# Patient Record
Sex: FEMALE | Race: WHITE | NOT HISPANIC OR LATINO | Employment: FULL TIME | URBAN - METROPOLITAN AREA
[De-identification: names, ages, dates, MRNs, and addresses within clinical notes are randomized per-mention and may not be internally consistent; named-entity substitution may affect disease eponyms.]

---

## 2023-04-06 DIAGNOSIS — I10 HYPERTENSION, UNSPECIFIED TYPE: ICD-10-CM

## 2023-04-06 RX ORDER — ATENOLOL 25 MG/1
25 TABLET ORAL DAILY
COMMUNITY
End: 2023-04-06 | Stop reason: SDUPTHER

## 2023-04-06 RX ORDER — LISINOPRIL AND HYDROCHLOROTHIAZIDE 20; 25 MG/1; MG/1
1 TABLET ORAL DAILY
COMMUNITY
End: 2023-04-06 | Stop reason: SDUPTHER

## 2023-04-07 RX ORDER — LISINOPRIL AND HYDROCHLOROTHIAZIDE 20; 25 MG/1; MG/1
1 TABLET ORAL DAILY
Qty: 90 TABLET | Refills: 1 | Status: SHIPPED | OUTPATIENT
Start: 2023-04-07 | End: 2023-07-05 | Stop reason: SDUPTHER

## 2023-04-07 RX ORDER — ATENOLOL 25 MG/1
25 TABLET ORAL DAILY
Qty: 90 TABLET | Refills: 1 | Status: SHIPPED | OUTPATIENT
Start: 2023-04-07 | End: 2023-07-05 | Stop reason: SDUPTHER

## 2023-07-05 DIAGNOSIS — I10 HYPERTENSION, UNSPECIFIED TYPE: ICD-10-CM

## 2023-07-05 RX ORDER — ATENOLOL 25 MG/1
25 TABLET ORAL DAILY
Qty: 90 TABLET | Refills: 1 | Status: SHIPPED | OUTPATIENT
Start: 2023-07-05 | End: 2024-01-05 | Stop reason: SDUPTHER

## 2023-07-05 RX ORDER — LISINOPRIL AND HYDROCHLOROTHIAZIDE 20; 25 MG/1; MG/1
1 TABLET ORAL DAILY
Qty: 90 TABLET | Refills: 3 | Status: SHIPPED | OUTPATIENT
Start: 2023-07-05

## 2023-09-25 ENCOUNTER — OFFICE VISIT (OUTPATIENT)
Dept: PRIMARY CARE | Facility: CLINIC | Age: 58
End: 2023-09-25
Payer: COMMERCIAL

## 2023-09-25 ENCOUNTER — LAB (OUTPATIENT)
Dept: LAB | Facility: LAB | Age: 58
End: 2023-09-25
Payer: COMMERCIAL

## 2023-09-25 VITALS
BODY MASS INDEX: 35 KG/M2 | OXYGEN SATURATION: 97 % | HEART RATE: 67 BPM | HEIGHT: 64 IN | WEIGHT: 205 LBS | SYSTOLIC BLOOD PRESSURE: 126 MMHG | DIASTOLIC BLOOD PRESSURE: 82 MMHG

## 2023-09-25 DIAGNOSIS — Z12.11 ENCOUNTER FOR COLORECTAL CANCER SCREENING: ICD-10-CM

## 2023-09-25 DIAGNOSIS — Z00.00 ANNUAL PHYSICAL EXAM: ICD-10-CM

## 2023-09-25 DIAGNOSIS — Z00.00 ANNUAL PHYSICAL EXAM: Primary | ICD-10-CM

## 2023-09-25 DIAGNOSIS — Z12.12 ENCOUNTER FOR COLORECTAL CANCER SCREENING: ICD-10-CM

## 2023-09-25 DIAGNOSIS — I10 HYPERTENSION, UNSPECIFIED TYPE: ICD-10-CM

## 2023-09-25 LAB
ALANINE AMINOTRANSFERASE (SGPT) (U/L) IN SER/PLAS: 42 U/L (ref 7–45)
ALBUMIN (G/DL) IN SER/PLAS: 4.1 G/DL (ref 3.4–5)
ALKALINE PHOSPHATASE (U/L) IN SER/PLAS: 122 U/L (ref 33–110)
ANION GAP IN SER/PLAS: 14 MMOL/L (ref 10–20)
ASPARTATE AMINOTRANSFERASE (SGOT) (U/L) IN SER/PLAS: 24 U/L (ref 9–39)
BILIRUBIN TOTAL (MG/DL) IN SER/PLAS: 0.5 MG/DL (ref 0–1.2)
CALCIDIOL (25 OH VITAMIN D3) (NG/ML) IN SER/PLAS: 26 NG/ML
CALCIUM (MG/DL) IN SER/PLAS: 9.8 MG/DL (ref 8.6–10.6)
CARBON DIOXIDE, TOTAL (MMOL/L) IN SER/PLAS: 27 MMOL/L (ref 21–32)
CHLORIDE (MMOL/L) IN SER/PLAS: 105 MMOL/L (ref 98–107)
CHOLESTEROL (MG/DL) IN SER/PLAS: 157 MG/DL (ref 0–199)
CHOLESTEROL IN HDL (MG/DL) IN SER/PLAS: 48.3 MG/DL
CHOLESTEROL/HDL RATIO: 3.3
CREATININE (MG/DL) IN SER/PLAS: 0.81 MG/DL (ref 0.5–1.05)
ERYTHROCYTE DISTRIBUTION WIDTH (RATIO) BY AUTOMATED COUNT: 13.1 % (ref 11.5–14.5)
ERYTHROCYTE MEAN CORPUSCULAR HEMOGLOBIN CONCENTRATION (G/DL) BY AUTOMATED: 31.5 G/DL (ref 32–36)
ERYTHROCYTE MEAN CORPUSCULAR VOLUME (FL) BY AUTOMATED COUNT: 95 FL (ref 80–100)
ERYTHROCYTES (10*6/UL) IN BLOOD BY AUTOMATED COUNT: 4.13 X10E12/L (ref 4–5.2)
GFR FEMALE: 84 ML/MIN/1.73M2
GLUCOSE (MG/DL) IN SER/PLAS: 87 MG/DL (ref 74–99)
HEMATOCRIT (%) IN BLOOD BY AUTOMATED COUNT: 39.1 % (ref 36–46)
HEMOGLOBIN (G/DL) IN BLOOD: 12.3 G/DL (ref 12–16)
LDL: 92 MG/DL (ref 0–99)
LEUKOCYTES (10*3/UL) IN BLOOD BY AUTOMATED COUNT: 7.1 X10E9/L (ref 4.4–11.3)
NRBC (PER 100 WBCS) BY AUTOMATED COUNT: 0 /100 WBC (ref 0–0)
PLATELETS (10*3/UL) IN BLOOD AUTOMATED COUNT: 315 X10E9/L (ref 150–450)
POTASSIUM (MMOL/L) IN SER/PLAS: 3.9 MMOL/L (ref 3.5–5.3)
PROTEIN TOTAL: 7 G/DL (ref 6.4–8.2)
SODIUM (MMOL/L) IN SER/PLAS: 142 MMOL/L (ref 136–145)
THYROTROPIN (MIU/L) IN SER/PLAS BY DETECTION LIMIT <= 0.05 MIU/L: 1.77 MIU/L (ref 0.44–3.98)
TRIGLYCERIDE (MG/DL) IN SER/PLAS: 85 MG/DL (ref 0–149)
UREA NITROGEN (MG/DL) IN SER/PLAS: 23 MG/DL (ref 6–23)
VLDL: 17 MG/DL (ref 0–40)

## 2023-09-25 PROCEDURE — 84443 ASSAY THYROID STIM HORMONE: CPT

## 2023-09-25 PROCEDURE — 1036F TOBACCO NON-USER: CPT | Performed by: INTERNAL MEDICINE

## 2023-09-25 PROCEDURE — 85027 COMPLETE CBC AUTOMATED: CPT

## 2023-09-25 PROCEDURE — 3074F SYST BP LT 130 MM HG: CPT | Performed by: INTERNAL MEDICINE

## 2023-09-25 PROCEDURE — 80053 COMPREHEN METABOLIC PANEL: CPT

## 2023-09-25 PROCEDURE — 82306 VITAMIN D 25 HYDROXY: CPT

## 2023-09-25 PROCEDURE — 93000 ELECTROCARDIOGRAM COMPLETE: CPT | Performed by: INTERNAL MEDICINE

## 2023-09-25 PROCEDURE — 3079F DIAST BP 80-89 MM HG: CPT | Performed by: INTERNAL MEDICINE

## 2023-09-25 PROCEDURE — 80061 LIPID PANEL: CPT

## 2023-09-25 PROCEDURE — 99396 PREV VISIT EST AGE 40-64: CPT | Performed by: INTERNAL MEDICINE

## 2023-09-25 PROCEDURE — 36415 COLL VENOUS BLD VENIPUNCTURE: CPT

## 2023-09-25 RX ORDER — ASPIRIN 325 MG
TABLET ORAL
COMMUNITY
Start: 2021-04-12

## 2023-09-25 NOTE — PROGRESS NOTES
"Subjective   Patient ID: Liebrtad Rodrigues is a 58 y.o. female who presents for the following    PHYSICAL 9/25/2023    Assessment/Plan     Refer to gyn    Mammogram screening    EKG    Cologuard sent    CBC, CMP, lipid panel, a1c, tsh, vitamin d      HPI  Female with htn, anxiety, depression, who presents for the fllowing     LYMPHEDEMA: patient is getting dependent edema after a long shift and she has lymphedema. she wants to know what to do about it.       HTN: patient denies any headaches, blurred vision or dizziness. patient denies any stroke like symptoms     anxiety/depression: stable on zoloft. she had ran out of it. denies SI or HI. sleep has been slightly poor recently. increased appetite.      surgical hx: hysterectomy 2-3 years ago for menorrhagia and cysts      allergies: NKDA     family hx: father HTN, DM     social hx: lives with her father. denies smoking, rare alcohol, denies drug use. works at nursing home     cologuard normal several years ago       Visit Vitals  /82   Pulse 67   Ht 1.626 m (5' 4\")   Wt 93 kg (205 lb)   SpO2 97%   BMI 35.19 kg/m²   Smoking Status Never   BSA 2.05 m²     PHYSICAL EXAM   General appearance: Alert and in no acute distress. speech is clear and coherent  HEENT: Sclera and conjunctiva white, EOMI, uvela midline, no mouth lesions. PERRLA,  nasal turbinates are not swollen without exudate. TM's Neal with cone of light, external ear canal with scant cerumen. No head trauma  Neck: no carotid bruits or thyromegaly. no lymphadenopathy   Respiratory : No respiratory distress, normal respiratory rhythm and effort. Clear bilateral breath sounds. No wheezing or rhonchi.   Cardiovascular: heart rate regular, S1, S2. no murmurs. no Lower extremity edema  Skin inspection: Normal skin color and pigmentation, normal skin turgor and no visible rash, induration, or cellulitis  MSK: 5/5 strength upper and lower extremities without gait abnormalities. no loss of muscle mass "   Neuro: 2-12 CN grossly intact.  no slurred speech. no lateralizing deficit  Psychiatric Orientation: Oriented to person, place, and time. no depression, homicidal or suicidal thoughts, normal affect  Abdomen: soft, none tender, none distended. no organomegaly      REVIEW OF SYSTEMS   Constitutional: not feeling tired and no fever, chills or sweats. Denies weight loss    HEENT: no earache and no sore throat. no blurred vision and or double vision. no headache  Cardiovascular: no exertional chest pain, no palpitations, no lower extremity edema and no intermittent leg claudication.   Lungs: Denies shortness of breath, exertional dyspnea, wheezing  Gastrointestinal: no change in bowel habits, no diarrhea, no nausea, no vomiting and no abdominal pain. Denies Melena, brbpr or dark stool  Musculoskeletal: no myalgias, no muscle weakness and no limb swelling.   Skin: no rashes, no change in skin color and pigmentation and no skin lumps.   Neurological: no headaches, no seizures, no numbness, no lateralizing deficits and no fainting.   Psychiatric: no depression and no anxiety.   Urine: denies polyuria, hematuria, dysuria   Endocrine: no cold intolerance, no heat intolerance      No Known Allergies    Current Outpatient Medications   Medication Sig Dispense Refill    aspirin 325 mg tablet Take by mouth.      atenolol (Tenormin) 25 mg tablet Take 1 tablet (25 mg) by mouth once daily. 90 tablet 1    lisinopriL-hydrochlorothiazide 20-25 mg tablet Take 1 tablet by mouth once daily. 90 tablet 3     No current facility-administered medications for this visit.       Objective     No visits with results within 4 Month(s) from this visit.   Latest known visit with results is:   Legacy Encounter on 04/12/2021   Component Date Value Ref Range Status    Vitamin D, 25-Hydroxy 04/12/2021 17 (A)  ng/mL Final    Cholesterol 04/12/2021 156  0 - 199 mg/dL Final    HDL 04/12/2021 36.0 (A)  mg/dL Final    Cholesterol/HDL Ratio 04/12/2021 4.3    Final    LDL 04/12/2021 82  0 - 99 mg/dL Final    VLDL 04/12/2021 38  0 - 40 mg/dL Final    Triglycerides 04/12/2021 191 (H)  0 - 149 mg/dL Final    TSH 04/12/2021 1.97  0.44 - 3.98 mIU/L Final    Hemoglobin A1C 04/12/2021 5.6  % Final    Estimated Average Glucose 04/12/2021 114  MG/DL Final    Glucose 04/12/2021 88  74 - 99 mg/dL Final    Sodium 04/12/2021 142  136 - 145 mmol/L Final    Potassium 04/12/2021 3.7  3.5 - 5.3 mmol/L Final    Chloride 04/12/2021 104  98 - 107 mmol/L Final    Bicarbonate 04/12/2021 28  21 - 32 mmol/L Final    Anion Gap 04/12/2021 14  10 - 20 mmol/L Final    Urea Nitrogen 04/12/2021 16  6 - 23 mg/dL Final    Creatinine 04/12/2021 0.72  0.50 - 1.05 mg/dL Final    GLOMERULAR FILTRATION RATE-NON AFR* 04/12/2021 >60  >60 mL/min/1.73m2 Final    GLOMERULAR FILTRATION RATE-* 04/12/2021 >60  >60 mL/min/1.73m2 Final    Calcium 04/12/2021 8.8  8.6 - 10.6 mg/dL Final    Albumin 04/12/2021 4.0  3.4 - 5.0 g/dL Final    Alkaline Phosphatase 04/12/2021 99  33 - 110 U/L Final    Total Protein 04/12/2021 7.0  6.4 - 8.2 g/dL Final    AST 04/12/2021 17  9 - 39 U/L Final    Total Bilirubin 04/12/2021 0.4  0.0 - 1.2 mg/dL Final    ALT (SGPT) 04/12/2021 27  7 - 45 U/L Final    WBC 04/12/2021 6.5  4.4 - 11.3 x10E9/L Final    nRBC 04/12/2021 0.0  0.0 - 0.0 /100 WBC Final    RBC 04/12/2021 4.46  4.00 - 5.20 x10E12/L Final    Hemoglobin 04/12/2021 13.0  12.0 - 16.0 g/dL Final    Hematocrit 04/12/2021 41.5  36.0 - 46.0 % Final    MCV 04/12/2021 93  80 - 100 fL Final    MCHC 04/12/2021 31.3 (L)  32.0 - 36.0 g/dL Final    Platelets 04/12/2021 323  150 - 450 x10E9/L Final    RDW 04/12/2021 13.5  11.5 - 14.5 % Final       Radiology: Reviewed imaging in powerchart.  No results found.    No family history on file.  Social History     Socioeconomic History    Marital status:      Spouse name: None    Number of children: None    Years of education: None    Highest education level: None   Occupational  History    None   Tobacco Use    Smoking status: Never    Smokeless tobacco: Never   Substance and Sexual Activity    Alcohol use: Not Currently    Drug use: None    Sexual activity: None   Other Topics Concern    None   Social History Narrative    None     Social Determinants of Health     Financial Resource Strain: Not on file   Food Insecurity: Not on file   Transportation Needs: Not on file   Physical Activity: Not on file   Stress: Not on file   Social Connections: Not on file   Intimate Partner Violence: Not on file   Housing Stability: Not on file     Past Medical History:   Diagnosis Date    Encounter for screening for malignant neoplasm of vagina     Vaginal Pap smear    Other specified health status     No pertinent past surgical history    Personal history of other medical treatment     History of mammogram     Past Surgical History:   Procedure Laterality Date    OTHER SURGICAL HISTORY  04/12/2021    Hysterectomy       Charting was completed using voice recognition technology and may include unintended errors.

## 2023-10-09 ENCOUNTER — TELEMEDICINE (OUTPATIENT)
Dept: PRIMARY CARE | Facility: CLINIC | Age: 58
End: 2023-10-09
Payer: COMMERCIAL

## 2023-10-09 VITALS — BODY MASS INDEX: 35 KG/M2 | HEIGHT: 64 IN | WEIGHT: 205 LBS

## 2023-10-09 DIAGNOSIS — R74.8 ELEVATED ALKALINE PHOSPHATASE LEVEL: Primary | ICD-10-CM

## 2023-10-09 PROCEDURE — 99213 OFFICE O/P EST LOW 20 MIN: CPT | Performed by: INTERNAL MEDICINE

## 2023-10-09 NOTE — PROGRESS NOTES
"Subjective   Patient ID: Libertad Rodrigues is a 58 y.o. female who presents for the following    Virtual visit regarding elevated alk phos and low vit d     PHYSICAL 9/25/2023    Assessment/Plan   Elevated alkaline phos; cmp, acute hepatitis , ferritin, CMV, ggt    Liver ultrasound     Vit D recommend 2000 units     Refer to gyn    Mammogram screening    EKG    Cologuard sent         HPI  Female with htn, anxiety, depression, who presents for the fllowing     Elevated alkaline phos level: patient vit d  is low and she does not take any OTC vit d. She denies any abdominal pain at this time. She does have arthritic pain in her back at times    Other medical issues, see below,  LYMPHEDEMA: patient is getting dependent edema after a long shift and she has lymphedema. she wants to know what to do about it.       HTN: patient denies any headaches, blurred vision or dizziness. patient denies any stroke like symptoms     anxiety/depression: stable on zoloft. she had ran out of it. denies SI or HI. sleep has been slightly poor recently. increased appetite.      surgical hx: hysterectomy 2-3 years ago for menorrhagia and cysts      allergies: NKDA     family hx: father HTN, DM     social hx: lives with her father. 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      atenolol (Tenormin) 25 mg tablet Take 1 tablet (25 mg) by mouth once daily. 90 tablet 1    lisinopriL-hydrochlorothiazide 20-25 mg tablet Take 1 tablet by mouth once daily. 90 tablet 3     No current facility-administered medications for this visit.       Objective     Lab on 09/25/2023   Component Date Value Ref " Range Status    WBC 09/25/2023 7.1  4.4 - 11.3 x10E9/L Final    nRBC 09/25/2023 0.0  0.0 - 0.0 /100 WBC Final    RBC 09/25/2023 4.13  4.00 - 5.20 x10E12/L Final    Hemoglobin 09/25/2023 12.3  12.0 - 16.0 g/dL Final    Hematocrit 09/25/2023 39.1  36.0 - 46.0 % Final    MCV 09/25/2023 95  80 - 100 fL Final    MCHC 09/25/2023 31.5 (L)  32.0 - 36.0 g/dL Final    Platelets 09/25/2023 315  150 - 450 x10E9/L Final    RDW 09/25/2023 13.1  11.5 - 14.5 % Final    Glucose 09/25/2023 87  74 - 99 mg/dL Final    Sodium 09/25/2023 142  136 - 145 mmol/L Final    Potassium 09/25/2023 3.9  3.5 - 5.3 mmol/L Final    Chloride 09/25/2023 105  98 - 107 mmol/L Final    Bicarbonate 09/25/2023 27  21 - 32 mmol/L Final    Anion Gap 09/25/2023 14  10 - 20 mmol/L Final    Urea Nitrogen 09/25/2023 23  6 - 23 mg/dL Final    Creatinine 09/25/2023 0.81  0.50 - 1.05 mg/dL Final    GFR Female 09/25/2023 84  >90 mL/min/1.73m2 Final    Calcium 09/25/2023 9.8  8.6 - 10.6 mg/dL Final    Albumin 09/25/2023 4.1  3.4 - 5.0 g/dL Final    Alkaline Phosphatase 09/25/2023 122 (H)  33 - 110 U/L Final    Total Protein 09/25/2023 7.0  6.4 - 8.2 g/dL Final    AST 09/25/2023 24  9 - 39 U/L Final    Total Bilirubin 09/25/2023 0.5  0.0 - 1.2 mg/dL Final    ALT (SGPT) 09/25/2023 42  7 - 45 U/L Final    Cholesterol 09/25/2023 157  0 - 199 mg/dL Final    HDL 09/25/2023 48.3  mg/dL Final    Cholesterol/HDL Ratio 09/25/2023 3.3   Final    LDL 09/25/2023 92  0 - 99 mg/dL Final    VLDL 09/25/2023 17  0 - 40 mg/dL Final    Triglycerides 09/25/2023 85  0 - 149 mg/dL Final    TSH 09/25/2023 1.77  0.44 - 3.98 mIU/L Final    Vitamin D, 25-Hydroxy 09/25/2023 26 (A)  ng/mL Final       Radiology: Reviewed imaging in powerchart.  No results found.    No family history on file.  Social History     Socioeconomic History    Marital status:      Spouse name: Not on file    Number of children: Not on file    Years of education: Not on file    Highest education level: Not on file    Occupational History    Not on file   Tobacco Use    Smoking status: Never    Smokeless tobacco: Never   Substance and Sexual Activity    Alcohol use: Not Currently    Drug use: Not on file    Sexual activity: Not on file   Other Topics Concern    Not on file   Social History Narrative    Not on file     Social Determinants of Health     Financial Resource Strain: Not on file   Food Insecurity: Not on file   Transportation Needs: Not on file   Physical Activity: Not on file   Stress: Not on file   Social Connections: Not on file   Intimate Partner Violence: Not on file   Housing Stability: Not on file     Past Medical History:   Diagnosis Date    Encounter for screening for malignant neoplasm of vagina     Vaginal Pap smear    Other specified health status     No pertinent past surgical history    Personal history of other medical treatment     History of mammogram     Past Surgical History:   Procedure Laterality Date    OTHER SURGICAL HISTORY  04/12/2021    Hysterectomy       Charting was completed using voice recognition technology and may include unintended errors.

## 2023-10-10 ENCOUNTER — LAB (OUTPATIENT)
Dept: LAB | Facility: LAB | Age: 58
End: 2023-10-10
Payer: COMMERCIAL

## 2023-10-10 DIAGNOSIS — R74.8 ELEVATED ALKALINE PHOSPHATASE LEVEL: ICD-10-CM

## 2023-10-10 LAB
ALBUMIN SERPL BCP-MCNC: 4.2 G/DL (ref 3.4–5)
ALP SERPL-CCNC: 130 U/L (ref 33–110)
ALT SERPL W P-5'-P-CCNC: 49 U/L (ref 7–45)
ANION GAP SERPL CALC-SCNC: 15 MMOL/L (ref 10–20)
AST SERPL W P-5'-P-CCNC: 30 U/L (ref 9–39)
BILIRUB SERPL-MCNC: 0.6 MG/DL (ref 0–1.2)
BUN SERPL-MCNC: 19 MG/DL (ref 6–23)
CALCIUM SERPL-MCNC: 9.7 MG/DL (ref 8.6–10.6)
CHLORIDE SERPL-SCNC: 106 MMOL/L (ref 98–107)
CMV IGG AVIDITY SERPL IA-RTO: NONREACTIVE %
CO2 SERPL-SCNC: 25 MMOL/L (ref 21–32)
CREAT SERPL-MCNC: 0.85 MG/DL (ref 0.5–1.05)
FERRITIN SERPL-MCNC: 178 NG/ML (ref 8–150)
GFR SERPL CREATININE-BSD FRML MDRD: 80 ML/MIN/1.73M*2
GGT SERPL-CCNC: 125 U/L (ref 5–55)
GLUCOSE SERPL-MCNC: 115 MG/DL (ref 74–99)
HAV IGM SER QL: NONREACTIVE
HBV CORE IGM SER QL: NONREACTIVE
HBV SURFACE AG SERPL QL IA: NONREACTIVE
HCV AB SER QL: NONREACTIVE
POTASSIUM SERPL-SCNC: 4 MMOL/L (ref 3.5–5.3)
PROT SERPL-MCNC: 7.2 G/DL (ref 6.4–8.2)
SODIUM SERPL-SCNC: 142 MMOL/L (ref 136–145)

## 2023-10-10 PROCEDURE — 86645 CMV ANTIBODY IGM: CPT

## 2023-10-10 PROCEDURE — 36415 COLL VENOUS BLD VENIPUNCTURE: CPT

## 2023-10-10 PROCEDURE — 82728 ASSAY OF FERRITIN: CPT

## 2023-10-10 PROCEDURE — 86644 CMV ANTIBODY: CPT

## 2023-10-10 PROCEDURE — 80053 COMPREHEN METABOLIC PANEL: CPT

## 2023-10-10 PROCEDURE — 80074 ACUTE HEPATITIS PANEL: CPT

## 2023-10-10 PROCEDURE — 82977 ASSAY OF GGT: CPT

## 2023-10-12 LAB — CMV IGM SERPL-ACNC: <8 AU/ML

## 2024-01-05 DIAGNOSIS — I10 HYPERTENSION, UNSPECIFIED TYPE: ICD-10-CM

## 2024-01-08 RX ORDER — ATENOLOL 25 MG/1
25 TABLET ORAL DAILY
Qty: 90 TABLET | Refills: 0 | Status: SHIPPED | OUTPATIENT
Start: 2024-01-08 | End: 2024-04-18 | Stop reason: SDUPTHER

## 2024-04-18 DIAGNOSIS — I10 HYPERTENSION, UNSPECIFIED TYPE: ICD-10-CM

## 2024-04-19 RX ORDER — ATENOLOL 25 MG/1
25 TABLET ORAL DAILY
Qty: 90 TABLET | Refills: 3 | Status: SHIPPED | OUTPATIENT
Start: 2024-04-19

## 2024-07-17 ENCOUNTER — TELEPHONE (OUTPATIENT)
Dept: PRIMARY CARE | Facility: CLINIC | Age: 59
End: 2024-07-17
Payer: COMMERCIAL

## 2024-07-17 DIAGNOSIS — I10 HYPERTENSION, UNSPECIFIED TYPE: ICD-10-CM

## 2024-07-17 RX ORDER — ATENOLOL 25 MG/1
25 TABLET ORAL DAILY
Qty: 90 TABLET | Refills: 3 | Status: SHIPPED | OUTPATIENT
Start: 2024-07-17

## 2024-08-22 DIAGNOSIS — I10 HYPERTENSION, UNSPECIFIED TYPE: ICD-10-CM

## 2024-08-23 RX ORDER — LISINOPRIL AND HYDROCHLOROTHIAZIDE 20; 25 MG/1; MG/1
1 TABLET ORAL DAILY
Qty: 90 TABLET | Refills: 3 | Status: SHIPPED | OUTPATIENT
Start: 2024-08-23

## 2024-09-25 ENCOUNTER — LAB (OUTPATIENT)
Dept: LAB | Facility: LAB | Age: 59
End: 2024-09-25
Payer: COMMERCIAL

## 2024-09-25 ENCOUNTER — APPOINTMENT (OUTPATIENT)
Dept: PRIMARY CARE | Facility: CLINIC | Age: 59
End: 2024-09-25
Payer: COMMERCIAL

## 2024-09-25 VITALS
SYSTOLIC BLOOD PRESSURE: 130 MMHG | HEART RATE: 68 BPM | BODY MASS INDEX: 35.34 KG/M2 | DIASTOLIC BLOOD PRESSURE: 72 MMHG | HEIGHT: 64 IN | WEIGHT: 207 LBS | OXYGEN SATURATION: 96 %

## 2024-09-25 DIAGNOSIS — R74.8 ELEVATED ALKALINE PHOSPHATASE LEVEL: ICD-10-CM

## 2024-09-25 DIAGNOSIS — R10.9 ABDOMINAL PAIN, UNSPECIFIED ABDOMINAL LOCATION: ICD-10-CM

## 2024-09-25 DIAGNOSIS — Z00.00 ANNUAL PHYSICAL EXAM: ICD-10-CM

## 2024-09-25 DIAGNOSIS — Z12.31 VISIT FOR SCREENING MAMMOGRAM: Primary | ICD-10-CM

## 2024-09-25 DIAGNOSIS — Z12.31 ENCOUNTER FOR SCREENING MAMMOGRAM FOR BREAST CANCER: ICD-10-CM

## 2024-09-25 LAB
25(OH)D3 SERPL-MCNC: 56 NG/ML (ref 30–100)
ALBUMIN SERPL BCP-MCNC: 4 G/DL (ref 3.4–5)
ALP SERPL-CCNC: 110 U/L (ref 33–110)
ALT SERPL W P-5'-P-CCNC: 37 U/L (ref 7–45)
ANION GAP SERPL CALC-SCNC: 12 MMOL/L (ref 10–20)
AST SERPL W P-5'-P-CCNC: 28 U/L (ref 9–39)
BILIRUB SERPL-MCNC: 0.3 MG/DL (ref 0–1.2)
BUN SERPL-MCNC: 20 MG/DL (ref 6–23)
CALCIUM SERPL-MCNC: 9.9 MG/DL (ref 8.6–10.6)
CHLORIDE SERPL-SCNC: 105 MMOL/L (ref 98–107)
CHOLEST SERPL-MCNC: 158 MG/DL (ref 0–199)
CHOLESTEROL/HDL RATIO: 3.5
CO2 SERPL-SCNC: 28 MMOL/L (ref 21–32)
CREAT SERPL-MCNC: 0.85 MG/DL (ref 0.5–1.05)
EGFRCR SERPLBLD CKD-EPI 2021: 79 ML/MIN/1.73M*2
ERYTHROCYTE [DISTWIDTH] IN BLOOD BY AUTOMATED COUNT: 13.3 % (ref 11.5–14.5)
EST. AVERAGE GLUCOSE BLD GHB EST-MCNC: 117 MG/DL
GLUCOSE SERPL-MCNC: 115 MG/DL (ref 74–99)
HBA1C MFR BLD: 5.7 %
HCT VFR BLD AUTO: 41.3 % (ref 36–46)
HDLC SERPL-MCNC: 45.7 MG/DL
HGB BLD-MCNC: 13.1 G/DL (ref 12–16)
LDLC SERPL CALC-MCNC: 95 MG/DL
MCH RBC QN AUTO: 29.1 PG (ref 26–34)
MCHC RBC AUTO-ENTMCNC: 31.7 G/DL (ref 32–36)
MCV RBC AUTO: 92 FL (ref 80–100)
NON HDL CHOLESTEROL: 112 MG/DL (ref 0–149)
NRBC BLD-RTO: 0 /100 WBCS (ref 0–0)
PLATELET # BLD AUTO: 344 X10*3/UL (ref 150–450)
POTASSIUM SERPL-SCNC: 4.5 MMOL/L (ref 3.5–5.3)
PROT SERPL-MCNC: 7.2 G/DL (ref 6.4–8.2)
RBC # BLD AUTO: 4.5 X10*6/UL (ref 4–5.2)
SODIUM SERPL-SCNC: 140 MMOL/L (ref 136–145)
TRIGL SERPL-MCNC: 87 MG/DL (ref 0–149)
TSH SERPL-ACNC: 2.97 MIU/L (ref 0.44–3.98)
VLDL: 17 MG/DL (ref 0–40)
WBC # BLD AUTO: 5.5 X10*3/UL (ref 4.4–11.3)

## 2024-09-25 PROCEDURE — 80061 LIPID PANEL: CPT

## 2024-09-25 PROCEDURE — 3078F DIAST BP <80 MM HG: CPT | Performed by: INTERNAL MEDICINE

## 2024-09-25 PROCEDURE — 85027 COMPLETE CBC AUTOMATED: CPT

## 2024-09-25 PROCEDURE — 99396 PREV VISIT EST AGE 40-64: CPT | Performed by: INTERNAL MEDICINE

## 2024-09-25 PROCEDURE — 83036 HEMOGLOBIN GLYCOSYLATED A1C: CPT

## 2024-09-25 PROCEDURE — 93000 ELECTROCARDIOGRAM COMPLETE: CPT | Performed by: INTERNAL MEDICINE

## 2024-09-25 PROCEDURE — 36415 COLL VENOUS BLD VENIPUNCTURE: CPT

## 2024-09-25 PROCEDURE — 82306 VITAMIN D 25 HYDROXY: CPT

## 2024-09-25 PROCEDURE — 86038 ANTINUCLEAR ANTIBODIES: CPT

## 2024-09-25 PROCEDURE — 80053 COMPREHEN METABOLIC PANEL: CPT

## 2024-09-25 PROCEDURE — 3075F SYST BP GE 130 - 139MM HG: CPT | Performed by: INTERNAL MEDICINE

## 2024-09-25 PROCEDURE — 3008F BODY MASS INDEX DOCD: CPT | Performed by: INTERNAL MEDICINE

## 2024-09-25 PROCEDURE — 84443 ASSAY THYROID STIM HORMONE: CPT

## 2024-09-25 PROCEDURE — 1036F TOBACCO NON-USER: CPT | Performed by: INTERNAL MEDICINE

## 2024-09-25 NOTE — PROGRESS NOTES
Subjective   Patient ID: Libertad oRdrigues is a 59 y.o. female who presents for the following       PHYSICAL 9/25/2024    Assessment/Plan   Elevated alkaline phos;      Ggt elevated  Acute hepatitis/CMV negative    Liver ultrasound order given     Twave inversion on lateral and inferior leads noted. Will send patient for stress test.     Vit D recommend 2000 units     Refer to gyn    Mammogram screening    EKG    Cologuard  8/30/22 negative         HPI  Female with htn, anxiety, depression, who presents for the fllowing     Elevated alkaline phos level: patient vit d  is low and she does not take any OTC vit d. She denies any abdominal pain at this time. She does have arthritic pain in her back at times     LYMPHEDEMA: patient is getting dependent edema after a long shift and she has lymphedema. she wants to know what to do about it.       HTN: patient denies any headaches, blurred vision or dizziness. patient denies any stroke like symptoms     anxiety/depression: stable on zoloft. she had ran out of it. denies SI or HI. sleep has been slightly poor recently. increased appetite.      surgical hx: hysterectomy 2-3 years ago for menorrhagia and cysts      allergies: NKDA     family hx: father HTN, DM     social hx: lives with her father. 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 speech is clear and coherent  HEENT: Sclera and conjunctiva white, EOMI, uvela midline, no mouth lesions. PERRLA,  nasal turbinates are not swollen without exudate. TM's Neal with cone of light, external ear canal with scant cerumen. No head trauma  Neck: no carotid bruits or thyromegaly. no lymphadenopathy   Respiratory : No respiratory distress, normal respiratory rhythm and effort. Clear bilateral breath sounds. No wheezing or rhonchi.   Cardiovascular:  heart rate regular, S1, S2. no murmurs. no Lower extremity edema  Skin inspection: Normal skin color and pigmentation, normal skin turgor and no visible rash, induration, or cellulitis  MSK: 5/5 strength upper and lower extremities without gait abnormalities. no loss of muscle mass   Neuro: 2-12 CN grossly intact.  no slurred speech. no lateralizing deficit  Psychiatric Orientation: Oriented to person, place, and time. no depression, homicidal or suicidal thoughts, normal affect  Abdomen: soft, none tender, none distended. no organomegaly      REVIEW OF SYSTEMS    Constitutional: not feeling tired and no fever, chills or sweats. Denies weight loss    HEENT: no earache and no sore throat. no blurred vision and or double vision. no headache  Cardiovascular: no exertional chest pain, no palpitations, no lower extremity edema and no intermittent leg claudication.   Lungs: Denies shortness of breath, exertional dyspnea, wheezing  Gastrointestinal: no change in bowel habits, no diarrhea, no nausea, no vomiting and no abdominal pain. Denies Melena, brbpr or dark stool  Musculoskeletal: no myalgias, no muscle weakness and no limb swelling.   Skin: no rashes, no change in skin color and pigmentation and no skin lumps.   Neurological: no headaches, no seizures, no numbness, no lateralizing deficits and no fainting.   Psychiatric: no depression and no anxiety.   Urine: denies polyuria, hematuria, dysuria   Endocrine: no cold intolerance, no heat intolerance    No Known Allergies    Current Outpatient Medications   Medication Sig Dispense Refill    aspirin 325 mg tablet Take by mouth.      atenolol (Tenormin) 25 mg tablet Take 1 tablet (25 mg) by mouth once daily. 90 tablet 3    lisinopriL-hydrochlorothiazide 20-25 mg tablet Take 1 tablet by mouth once daily. 90 tablet 3     No current facility-administered medications for this visit.       Objective     No visits with results within 4 Month(s) from this visit.   Latest known  visit with results is:   Lab on 10/10/2023   Component Date Value Ref Range Status    Hepatitis B Surface AG 10/10/2023 Nonreactive  Nonreactive Final    Hepatitis A  AB- IgM 10/10/2023 Nonreactive  Nonreactive Final    Hepatitis B Core AB; IgM 10/10/2023 Nonreactive  Nonreactive Final    Hepatitis C AB 10/10/2023 Nonreactive  Nonreactive Final    GGT 10/10/2023 125 (H)  5 - 55 U/L Final    Ferritin 10/10/2023 178 (H)  8 - 150 ng/mL Final    Glucose 10/10/2023 115 (H)  74 - 99 mg/dL Final    Sodium 10/10/2023 142  136 - 145 mmol/L Final    Potassium 10/10/2023 4.0  3.5 - 5.3 mmol/L Final    Chloride 10/10/2023 106  98 - 107 mmol/L Final    Bicarbonate 10/10/2023 25  21 - 32 mmol/L Final    Anion Gap 10/10/2023 15  10 - 20 mmol/L Final    Urea Nitrogen 10/10/2023 19  6 - 23 mg/dL Final    Creatinine 10/10/2023 0.85  0.50 - 1.05 mg/dL Final    eGFR 10/10/2023 80  >60 mL/min/1.73m*2 Final    Calcium 10/10/2023 9.7  8.6 - 10.6 mg/dL Final    Albumin 10/10/2023 4.2  3.4 - 5.0 g/dL Final    Alkaline Phosphatase 10/10/2023 130 (H)  33 - 110 U/L Final    Total Protein 10/10/2023 7.2  6.4 - 8.2 g/dL Final    AST 10/10/2023 30  9 - 39 U/L Final    Bilirubin, Total 10/10/2023 0.6  0.0 - 1.2 mg/dL Final    ALT 10/10/2023 49 (H)  7 - 45 U/L Final    Cytomegalovirus IgG 10/10/2023 Nonreactive  Nonreactive Final    CMV IgM 10/10/2023 <8.0  <=29.9 AU/mL Final       Radiology: Reviewed imaging in powerchart.  No results found.    No family history on file.  Social History     Socioeconomic History    Marital status:    Tobacco Use    Smoking status: Never    Smokeless tobacco: Never   Substance and Sexual Activity    Alcohol use: Not Currently     Past Medical History:   Diagnosis Date    Encounter for screening for malignant neoplasm of vagina     Vaginal Pap smear    Other specified health status     No pertinent past surgical history    Personal history of other medical treatment     History of mammogram     Past Surgical  History:   Procedure Laterality Date    OTHER SURGICAL HISTORY  04/12/2021    Hysterectomy       Charting was completed using voice recognition technology and may include unintended errors.

## 2024-09-26 LAB — ANA SER QL HEP2 SUBST: NEGATIVE

## 2024-09-27 ENCOUNTER — TELEPHONE (OUTPATIENT)
Dept: PRIMARY CARE | Facility: CLINIC | Age: 59
End: 2024-09-27
Payer: COMMERCIAL

## 2024-09-27 NOTE — TELEPHONE ENCOUNTER
----- Message from Calvin Arreguin sent at 9/26/2024  4:49 PM EDT -----  Patient is noted to be pre-diabetic. Recommend diet and weight loss follow up in 6 months for repeat testing for blood sugars

## 2024-09-27 NOTE — TELEPHONE ENCOUNTER
Lvmtcb Patient is noted to be pre-diabetic. Recommend diet and weight loss follow up in 6 months for repeat testing for blood sugars

## 2024-09-30 ENCOUNTER — TELEPHONE (OUTPATIENT)
Dept: PRIMARY CARE | Facility: CLINIC | Age: 59
End: 2024-09-30
Payer: COMMERCIAL

## 2024-10-03 ENCOUNTER — TELEPHONE (OUTPATIENT)
Dept: PRIMARY CARE | Facility: CLINIC | Age: 59
End: 2024-10-03
Payer: COMMERCIAL

## 2024-10-08 ENCOUNTER — TELEPHONE (OUTPATIENT)
Dept: PRIMARY CARE | Facility: CLINIC | Age: 59
End: 2024-10-08
Payer: COMMERCIAL

## 2025-03-28 ENCOUNTER — APPOINTMENT (OUTPATIENT)
Dept: PRIMARY CARE | Facility: CLINIC | Age: 60
End: 2025-03-28
Payer: COMMERCIAL

## 2025-03-28 VITALS
WEIGHT: 205.2 LBS | BODY MASS INDEX: 35.03 KG/M2 | DIASTOLIC BLOOD PRESSURE: 79 MMHG | SYSTOLIC BLOOD PRESSURE: 110 MMHG | HEIGHT: 64 IN | OXYGEN SATURATION: 91 % | HEART RATE: 82 BPM

## 2025-03-28 DIAGNOSIS — I10 PRIMARY HYPERTENSION: Primary | ICD-10-CM

## 2025-03-28 PROCEDURE — 3078F DIAST BP <80 MM HG: CPT | Performed by: INTERNAL MEDICINE

## 2025-03-28 PROCEDURE — 99213 OFFICE O/P EST LOW 20 MIN: CPT | Performed by: INTERNAL MEDICINE

## 2025-03-28 PROCEDURE — 3074F SYST BP LT 130 MM HG: CPT | Performed by: INTERNAL MEDICINE

## 2025-03-28 PROCEDURE — 3008F BODY MASS INDEX DOCD: CPT | Performed by: INTERNAL MEDICINE

## 2025-03-28 PROCEDURE — 1036F TOBACCO NON-USER: CPT | Performed by: INTERNAL MEDICINE

## 2025-03-28 RX ORDER — CHOLECALCIFEROL (VITAMIN D3) 25 MCG
1000 TABLET ORAL DAILY
COMMUNITY

## 2025-03-28 ASSESSMENT — PATIENT HEALTH QUESTIONNAIRE - PHQ9
SUM OF ALL RESPONSES TO PHQ9 QUESTIONS 1 AND 2: 0
2. FEELING DOWN, DEPRESSED OR HOPELESS: NOT AT ALL
1. LITTLE INTEREST OR PLEASURE IN DOING THINGS: NOT AT ALL

## 2025-03-28 NOTE — PROGRESS NOTES
"Subjective   Patient ID: Libertad Rodrigues is a 59 y.o. female who presents for     CHRONIC DISEASE FOLLOW UP       PHYSICAL 9/25/2024    Assessment/Plan   Twave inversion on lateral and inferior leads noted.  Nuclear stress test 10/17/24 wnl     Vit D recommend 2000 units     Refer to gyn    Mammogram screening October typically normal     EKG    [facility]  8/30/22 negative         HPI  Female with htn, anxiety, depression, who presents for the fllowing     LYMPHEDEMA: patient is getting dependent edema after a long shift and she has lymphedema. she wants to know what to do about it.       HTN: patient denies any headaches, blurred vision or dizziness. patient denies any stroke like symptoms     anxiety/depression: stable on zoloft. she had ran out of it. denies SI or HI. sleep has been slightly poor recently. increased appetite.      surgical hx: hysterectomy 2-3 years ago for menorrhagia and cysts    cholecystectomy 10/11/2024    allergies: NKDA     family hx: father HTN, DM     social hx: lives with her father. denies smoking, rare alcohol, denies drug use. works at nursing home     cologuard normal several years ago       Visit Vitals  /79   Pulse 82   Ht 1.626 m (5' 4\")   Wt 93.1 kg (205 lb 3.2 oz)   SpO2 91%   BMI 35.22 kg/m²   Smoking Status Never   BSA 2.05 m²     PHYSICAL EXAM      General appearance: Alert and in no acute distress. speech is clear and coherent  HEENT: Sclera and conjunctiva white, EOMI,    Respiratory : No respiratory distress, normal respiratory rhythm and effort. Clear bilateral breath sounds. No wheezing or rhonchi.   Cardiovascular: heart rate regular, S1, S2. no murmurs. no Lower extremity edema   MSK: 5/5 strength upper and lower extremities without gait abnormalities. no loss of muscle mass   Neuro: 2-12 CN grossly intact.  no slurred speech. no lateralizing deficit  Psychiatric Orientation: Oriented to person, place, and time. no depression, homicidal or suicidal " thoughts, normal affect       REVIEW OF SYSTEMS    Constitutional: not feeling tired and no fever, chills or sweats. Denies weight loss     Cardiovascular: no exertional chest pain, no palpitations, no lower extremity edema and no intermittent leg claudication.   Lungs: Denies shortness of breath, exertional dyspnea, wheezing  Gastrointestinal: no change in bowel habits, no diarrhea, no nausea, no vomiting and no abdominal pain. Denies Melena, brbpr or dark stool  Musculoskeletal: no myalgias, no muscle weakness and no limb swelling.    Neurological: no headaches, no seizures, no numbness, no lateralizing deficits and no fainting.   Psychiatric: no depression and no anxiety.   Urine: denies polyuria, hematuria, dysuria      No Known Allergies    Current Outpatient Medications   Medication Sig Dispense Refill    aspirin 325 mg tablet Take by mouth.      atenolol (Tenormin) 25 mg tablet Take 1 tablet (25 mg) by mouth once daily. 90 tablet 3    cholecalciferol (Vitamin D3) 25 mcg (1000 units) tablet Take 1 tablet (1,000 Units) by mouth once daily.      lisinopriL-hydrochlorothiazide 20-25 mg tablet Take 1 tablet by mouth once daily. 90 tablet 3     No current facility-administered medications for this visit.       Objective     No visits with results within 4 Month(s) from this visit.   Latest known visit with results is:   Lab on 09/25/2024   Component Date Value Ref Range Status    WBC 09/25/2024 5.5  4.4 - 11.3 x10*3/uL Final    nRBC 09/25/2024 0.0  0.0 - 0.0 /100 WBCs Final    RBC 09/25/2024 4.50  4.00 - 5.20 x10*6/uL Final    Hemoglobin 09/25/2024 13.1  12.0 - 16.0 g/dL Final    Hematocrit 09/25/2024 41.3  36.0 - 46.0 % Final    MCV 09/25/2024 92  80 - 100 fL Final    MCH 09/25/2024 29.1  26.0 - 34.0 pg Final    MCHC 09/25/2024 31.7 (L)  32.0 - 36.0 g/dL Final    RDW 09/25/2024 13.3  11.5 - 14.5 % Final    Platelets 09/25/2024 344  150 - 450 x10*3/uL Final    Glucose 09/25/2024 115 (H)  74 - 99 mg/dL Final     Sodium 09/25/2024 140  136 - 145 mmol/L Final    Potassium 09/25/2024 4.5  3.5 - 5.3 mmol/L Final    Chloride 09/25/2024 105  98 - 107 mmol/L Final    Bicarbonate 09/25/2024 28  21 - 32 mmol/L Final    Anion Gap 09/25/2024 12  10 - 20 mmol/L Final    Urea Nitrogen 09/25/2024 20  6 - 23 mg/dL Final    Creatinine 09/25/2024 0.85  0.50 - 1.05 mg/dL Final    eGFR 09/25/2024 79  >60 mL/min/1.73m*2 Final    Calcium 09/25/2024 9.9  8.6 - 10.6 mg/dL Final    Albumin 09/25/2024 4.0  3.4 - 5.0 g/dL Final    Alkaline Phosphatase 09/25/2024 110  33 - 110 U/L Final    Total Protein 09/25/2024 7.2  6.4 - 8.2 g/dL Final    AST 09/25/2024 28  9 - 39 U/L Final    Bilirubin, Total 09/25/2024 0.3  0.0 - 1.2 mg/dL Final    ALT 09/25/2024 37  7 - 45 U/L Final    Hemoglobin A1C 09/25/2024 5.7 (H)  See comment % Final    Estimated Average Glucose 09/25/2024 117  Not Established mg/dL Final    Cholesterol 09/25/2024 158  0 - 199 mg/dL Final    HDL-Cholesterol 09/25/2024 45.7  mg/dL Final    Cholesterol/HDL Ratio 09/25/2024 3.5   Final    LDL Calculated 09/25/2024 95  <=99 mg/dL Final    VLDL 09/25/2024 17  0 - 40 mg/dL Final    Triglycerides 09/25/2024 87  0 - 149 mg/dL Final    Non HDL Cholesterol 09/25/2024 112  0 - 149 mg/dL Final    Thyroid Stimulating Hormone 09/25/2024 2.97  0.44 - 3.98 mIU/L Final    Vitamin D, 25-Hydroxy, Total 09/25/2024 56  30 - 100 ng/mL Final    RICKY 09/25/2024 Negative  Negative Final       Radiology: Reviewed imaging in powerchart.  No results found.    No family history on file.  Social History     Socioeconomic History    Marital status:    Tobacco Use    Smoking status: Never    Smokeless tobacco: Never   Substance and Sexual Activity    Alcohol use: Not Currently    Drug use: Never     Past Medical History:   Diagnosis Date    Encounter for screening for malignant neoplasm of vagina     Vaginal Pap smear    Other specified health status     No pertinent past surgical history    Personal history of  other medical treatment     History of mammogram     Past Surgical History:   Procedure Laterality Date    OTHER SURGICAL HISTORY  04/12/2021    Hysterectomy       Charting was completed using voice recognition technology and may include unintended errors.

## 2025-08-05 ENCOUNTER — TELEPHONE (OUTPATIENT)
Dept: PRIMARY CARE | Facility: CLINIC | Age: 60
End: 2025-08-05
Payer: COMMERCIAL

## 2025-08-05 DIAGNOSIS — I10 HYPERTENSION, UNSPECIFIED TYPE: ICD-10-CM

## 2025-08-05 RX ORDER — ATENOLOL 25 MG/1
25 TABLET ORAL DAILY
Qty: 90 TABLET | Refills: 3 | Status: SHIPPED | OUTPATIENT
Start: 2025-08-05

## 2025-08-22 ENCOUNTER — TELEPHONE (OUTPATIENT)
Dept: PRIMARY CARE | Facility: CLINIC | Age: 60
End: 2025-08-22
Payer: COMMERCIAL

## 2025-08-22 DIAGNOSIS — I10 HYPERTENSION, UNSPECIFIED TYPE: ICD-10-CM

## 2025-08-22 RX ORDER — LISINOPRIL AND HYDROCHLOROTHIAZIDE 20; 25 MG/1; MG/1
1 TABLET ORAL DAILY
Qty: 90 TABLET | Refills: 3 | Status: SHIPPED | OUTPATIENT
Start: 2025-08-22

## 2025-09-29 ENCOUNTER — APPOINTMENT (OUTPATIENT)
Dept: PRIMARY CARE | Facility: CLINIC | Age: 60
End: 2025-09-29
Payer: COMMERCIAL